# Patient Record
Sex: MALE | Race: OTHER
[De-identification: names, ages, dates, MRNs, and addresses within clinical notes are randomized per-mention and may not be internally consistent; named-entity substitution may affect disease eponyms.]

---

## 2018-04-06 ENCOUNTER — HOSPITAL ENCOUNTER (EMERGENCY)
Dept: HOSPITAL 12 - ER | Age: 78
Discharge: HOME | End: 2018-04-06
Payer: MEDICARE

## 2018-04-06 VITALS — BODY MASS INDEX: 29.55 KG/M2 | HEIGHT: 68 IN | WEIGHT: 195 LBS

## 2018-04-06 DIAGNOSIS — Z79.84: ICD-10-CM

## 2018-04-06 DIAGNOSIS — E11.9: ICD-10-CM

## 2018-04-06 DIAGNOSIS — Z79.4: ICD-10-CM

## 2018-04-06 DIAGNOSIS — Z79.899: ICD-10-CM

## 2018-04-06 DIAGNOSIS — I10: ICD-10-CM

## 2018-04-06 DIAGNOSIS — I25.10: ICD-10-CM

## 2018-04-06 DIAGNOSIS — M43.6: Primary | ICD-10-CM

## 2018-04-06 PROCEDURE — A4663 DIALYSIS BLOOD PRESSURE CUFF: HCPCS

## 2018-06-11 ENCOUNTER — HOSPITAL ENCOUNTER (INPATIENT)
Dept: HOSPITAL 12 - ER | Age: 78
LOS: 2 days | Discharge: HOME | DRG: 440 | End: 2018-06-13
Attending: INTERNAL MEDICINE | Admitting: INTERNAL MEDICINE
Payer: MEDICARE

## 2018-06-11 VITALS — HEIGHT: 68 IN | WEIGHT: 191 LBS | BODY MASS INDEX: 28.95 KG/M2

## 2018-06-11 DIAGNOSIS — K85.90: Primary | ICD-10-CM

## 2018-06-11 DIAGNOSIS — I25.10: ICD-10-CM

## 2018-06-11 DIAGNOSIS — I10: ICD-10-CM

## 2018-06-11 DIAGNOSIS — Z79.84: ICD-10-CM

## 2018-06-11 DIAGNOSIS — Z95.1: ICD-10-CM

## 2018-06-11 DIAGNOSIS — E11.65: ICD-10-CM

## 2018-06-11 DIAGNOSIS — Z85.46: ICD-10-CM

## 2018-06-11 LAB
ALP SERPL-CCNC: 53 U/L (ref 50–136)
ALT SERPL W/O P-5'-P-CCNC: 67 U/L (ref 16–63)
APPEARANCE UR: CLEAR
AST SERPL-CCNC: 42 U/L (ref 15–37)
BASOPHILS # BLD AUTO: 0.1 K/UL (ref 0–8)
BASOPHILS NFR BLD AUTO: 1.1 % (ref 0–2)
BILIRUB DIRECT SERPL-MCNC: 0.1 MG/DL (ref 0–0.2)
BILIRUB SERPL-MCNC: 0.3 MG/DL (ref 0.2–1)
BILIRUB UR QL STRIP: NEGATIVE
BUN SERPL-MCNC: 16 MG/DL (ref 7–18)
CHLORIDE SERPL-SCNC: 104 MMOL/L (ref 98–107)
CO2 SERPL-SCNC: 23 MMOL/L (ref 21–32)
COLOR UR: YELLOW
CREAT SERPL-MCNC: 1.2 MG/DL (ref 0.6–1.3)
DEPRECATED SQUAMOUS URNS QL MICRO: (no result) /HPF
EOSINOPHIL # BLD AUTO: 0.2 K/UL (ref 0–0.7)
EOSINOPHIL NFR BLD AUTO: 4.2 % (ref 0–7)
GLUCOSE SERPL-MCNC: 172 MG/DL (ref 74–106)
GLUCOSE UR STRIP-MCNC: NEGATIVE MG/DL
HCT VFR BLD AUTO: 32.2 % (ref 36.7–47.1)
HGB BLD-MCNC: 10.8 G/DL (ref 12.5–16.3)
HGB UR QL STRIP: NEGATIVE
KETONES UR STRIP-MCNC: NEGATIVE MG/DL
LEUKOCYTE ESTERASE UR QL STRIP: NEGATIVE
LIPASE SERPL-CCNC: 2126 U/L (ref 73–393)
LYMPHOCYTES # BLD AUTO: 1.8 K/UL (ref 20–40)
LYMPHOCYTES NFR BLD AUTO: 35.5 % (ref 20.5–51.5)
MCH RBC QN AUTO: 29 UUG (ref 23.8–33.4)
MCHC RBC AUTO-ENTMCNC: 34 G/DL (ref 32.5–36.3)
MCV RBC AUTO: 86.4 FL (ref 73–96.2)
MONOCYTES # BLD AUTO: 0.5 K/UL (ref 2–10)
MONOCYTES NFR BLD AUTO: 10 % (ref 0–11)
NEUTROPHILS # BLD AUTO: 2.4 K/UL (ref 1.8–8.9)
NEUTROPHILS NFR BLD AUTO: 49.2 % (ref 38.5–71.5)
NITRITE UR QL STRIP: NEGATIVE
PH UR STRIP: 6 [PH] (ref 5–8)
PLATELET # BLD AUTO: 228 K/UL (ref 152–348)
POTASSIUM SERPL-SCNC: 4.6 MMOL/L (ref 3.5–5.1)
PROT UR QL STRIP: NEGATIVE
RBC # BLD AUTO: 3.72 MIL/UL (ref 4.06–5.63)
RBC #/AREA URNS HPF: (no result) /HPF (ref 0–3)
SP GR UR STRIP: 1.01 (ref 1–1.03)
UROBILINOGEN UR STRIP-MCNC: 0.2 E.U./DL
WBC # BLD AUTO: 4.9 K/UL (ref 3.6–10.2)
WBC #/AREA URNS HPF: (no result) /HPF
WBC #/AREA URNS HPF: (no result) /HPF (ref 0–3)
WS STN SPEC: 7.5 G/DL (ref 6.4–8.2)

## 2018-06-11 PROCEDURE — A4663 DIALYSIS BLOOD PRESSURE CUFF: HCPCS

## 2018-06-11 PROCEDURE — C9113 INJ PANTOPRAZOLE SODIUM, VIA: HCPCS

## 2018-06-12 VITALS — DIASTOLIC BLOOD PRESSURE: 60 MMHG | SYSTOLIC BLOOD PRESSURE: 136 MMHG

## 2018-06-12 VITALS — SYSTOLIC BLOOD PRESSURE: 135 MMHG | DIASTOLIC BLOOD PRESSURE: 62 MMHG

## 2018-06-12 VITALS — DIASTOLIC BLOOD PRESSURE: 57 MMHG | SYSTOLIC BLOOD PRESSURE: 119 MMHG

## 2018-06-12 VITALS — SYSTOLIC BLOOD PRESSURE: 137 MMHG | DIASTOLIC BLOOD PRESSURE: 57 MMHG

## 2018-06-12 VITALS — SYSTOLIC BLOOD PRESSURE: 122 MMHG | DIASTOLIC BLOOD PRESSURE: 56 MMHG

## 2018-06-12 LAB
ALP SERPL-CCNC: 42 U/L (ref 50–136)
ALT SERPL W/O P-5'-P-CCNC: 56 U/L (ref 16–63)
AST SERPL-CCNC: 36 U/L (ref 15–37)
BASOPHILS # BLD AUTO: 0 K/UL (ref 0–8)
BASOPHILS NFR BLD AUTO: 1 % (ref 0–2)
BILIRUB SERPL-MCNC: 0.3 MG/DL (ref 0.2–1)
BUN SERPL-MCNC: 11 MG/DL (ref 7–18)
CHLORIDE SERPL-SCNC: 108 MMOL/L (ref 98–107)
CHOLEST SERPL-MCNC: 105 MG/DL (ref ?–200)
CO2 SERPL-SCNC: 25 MMOL/L (ref 21–32)
CREAT SERPL-MCNC: 1.2 MG/DL (ref 0.6–1.3)
EOSINOPHIL # BLD AUTO: 0.2 K/UL (ref 0–0.7)
EOSINOPHIL NFR BLD AUTO: 4.4 % (ref 0–7)
GLUCOSE SERPL-MCNC: 124 MG/DL (ref 74–106)
HCT VFR BLD AUTO: 30.9 % (ref 36.7–47.1)
HDLC SERPL-MCNC: 43 MG/DL (ref 40–60)
HGB BLD-MCNC: 10.5 G/DL (ref 12.5–16.3)
IRON SERPL-MCNC: 49 UG/DL (ref 50–175)
LIPASE SERPL-CCNC: 450 U/L (ref 73–393)
LYMPHOCYTES # BLD AUTO: 1.5 K/UL (ref 20–40)
LYMPHOCYTES NFR BLD AUTO: 35.8 % (ref 20.5–51.5)
MAGNESIUM SERPL-MCNC: 1.7 MG/DL (ref 1.8–2.4)
MCH RBC QN AUTO: 29.3 UUG (ref 23.8–33.4)
MCHC RBC AUTO-ENTMCNC: 34 G/DL (ref 32.5–36.3)
MCV RBC AUTO: 86.4 FL (ref 73–96.2)
MONOCYTES # BLD AUTO: 0.4 K/UL (ref 2–10)
MONOCYTES NFR BLD AUTO: 9.1 % (ref 0–11)
NEUTROPHILS # BLD AUTO: 2.1 K/UL (ref 1.8–8.9)
NEUTROPHILS NFR BLD AUTO: 49.7 % (ref 38.5–71.5)
PHOSPHATE SERPL-MCNC: 3.2 MG/DL (ref 2.5–4.9)
PLATELET # BLD AUTO: 183 K/UL (ref 152–348)
POTASSIUM SERPL-SCNC: 4.5 MMOL/L (ref 3.5–5.1)
RBC # BLD AUTO: 3.58 MIL/UL (ref 4.06–5.63)
TRIGL SERPL-MCNC: 63 MG/DL (ref 30–150)
TSH SERPL DL<=0.005 MIU/L-ACNC: 4.76 MIU/ML (ref 0.36–3.74)
WBC # BLD AUTO: 4.3 K/UL (ref 3.6–10.2)
WS STN SPEC: 6.5 G/DL (ref 6.4–8.2)

## 2018-06-12 RX ADMIN — DEXTROSE SCH MG: 50 INJECTION, SOLUTION INTRAVENOUS at 08:21

## 2018-06-12 RX ADMIN — Medication SCH EACH: at 11:56

## 2018-06-12 RX ADMIN — SODIUM CHLORIDE PRN UNIT: 9 INJECTION, SOLUTION INTRAVENOUS at 20:41

## 2018-06-12 RX ADMIN — MAGNESIUM SULFATE IN DEXTROSE SCH MLS/HR: 10 INJECTION, SOLUTION INTRAVENOUS at 14:48

## 2018-06-12 RX ADMIN — DEXTROSE AND SODIUM CHLORIDE PRN MLS/HR: 5; .45 INJECTION, SOLUTION INTRAVENOUS at 00:58

## 2018-06-12 RX ADMIN — MAGNESIUM SULFATE IN DEXTROSE SCH MLS/HR: 10 INJECTION, SOLUTION INTRAVENOUS at 15:42

## 2018-06-12 RX ADMIN — Medication SCH EACH: at 00:58

## 2018-06-12 RX ADMIN — DEXTROSE AND SODIUM CHLORIDE PRN MLS/HR: 5; .45 INJECTION, SOLUTION INTRAVENOUS at 22:29

## 2018-06-12 RX ADMIN — AMLODIPINE BESYLATE SCH MG: 5 TABLET ORAL at 16:38

## 2018-06-12 RX ADMIN — Medication SCH EACH: at 06:47

## 2018-06-12 RX ADMIN — Medication SCH EACH: at 16:45

## 2018-06-12 RX ADMIN — METFORMIN HYDROCHLORIDE SCH MG: 500 TABLET ORAL at 16:45

## 2018-06-12 RX ADMIN — Medication SCH EACH: at 20:34

## 2018-06-12 RX ADMIN — LOSARTAN POTASSIUM SCH MG: 50 TABLET, FILM COATED ORAL at 16:39

## 2018-06-12 RX ADMIN — DEXTROSE AND SODIUM CHLORIDE PRN MLS/HR: 5; .45 INJECTION, SOLUTION INTRAVENOUS at 10:41

## 2018-06-12 NOTE — NUR
ENC TO OOB UP AMBULATE IN THE [address] AND UP IN CHAIR DOING WELL  FAMILY DAUGHTER AT 
BEDSIDE NO N/V OR PAIN

## 2018-06-12 NOTE — NUR
STABLE HEMODYNAMIC STATUS PAIN UNDER CONTROL CONTINUE IVF  SAFETY MEASURE PROVIDED AND CALL 
LIGHT IN REACH

## 2018-06-12 NOTE — NUR
Received patient from ER in no acute distress. Patient A/Ox4, [place] citizen-speaking. Grandson at 
bedside. VSS. Sinus bradycardia on monitor. Room air.

No complaints of pain or nausea at this time. Will continue plan of care.

## 2018-06-12 NOTE — NUR
Teaching provided regarding patient's diagnosis to both patient and grandson. Nepalese 
literature education and English given. Both verbalize understanding

## 2018-06-12 NOTE — NUR
Received patient awake in bed, alert and oriented. Patient is on Sinus bradycardia with HR 
of 50, no SOB, no chest pain noted. Kept clean and dry. Placed call light within reach and 
reminded to call when needed.

## 2018-06-12 NOTE — NUR
Grandson at bedside, states that patient "might have an Advance Directive" and that 
patient's daughter Liset Liu, [phone], may be able to provide in AM. Will follow up.

## 2018-06-12 NOTE — NUR
NICKY ENGLANDS SEEN PATIENT AND PT C/O OF HUNGRY AND H/A NEW ORDER IN CHART START CLEAR LIQ 
DIET  TODAY  SHANNAN MOD AMT NO N/V AND TYLENOL PO GIVEN

## 2018-06-12 NOTE — NUR
awake alert cooperate well no pain or n/v  keep npo continue ivf  ,resting well with call 
bell in reach and remiond to call  WHEN  NEEDED

## 2018-06-13 VITALS — SYSTOLIC BLOOD PRESSURE: 107 MMHG | DIASTOLIC BLOOD PRESSURE: 59 MMHG

## 2018-06-13 VITALS — SYSTOLIC BLOOD PRESSURE: 125 MMHG | DIASTOLIC BLOOD PRESSURE: 55 MMHG

## 2018-06-13 VITALS — DIASTOLIC BLOOD PRESSURE: 58 MMHG | SYSTOLIC BLOOD PRESSURE: 138 MMHG

## 2018-06-13 LAB
BASOPHILS # BLD AUTO: 0.1 K/UL (ref 0–8)
BASOPHILS NFR BLD AUTO: 1.3 % (ref 0–2)
BUN SERPL-MCNC: 8 MG/DL (ref 7–18)
CHLORIDE SERPL-SCNC: 107 MMOL/L (ref 98–107)
CO2 SERPL-SCNC: 26 MMOL/L (ref 21–32)
CREAT SERPL-MCNC: 1.1 MG/DL (ref 0.6–1.3)
EOSINOPHIL # BLD AUTO: 0.3 K/UL (ref 0–0.7)
EOSINOPHIL NFR BLD AUTO: 6.3 % (ref 0–7)
GLUCOSE SERPL-MCNC: 127 MG/DL (ref 74–106)
HCT VFR BLD AUTO: 32.1 % (ref 36.7–47.1)
HGB BLD-MCNC: 10.8 G/DL (ref 12.5–16.3)
LIPASE SERPL-CCNC: 366 U/L (ref 73–393)
LYMPHOCYTES # BLD AUTO: 1.5 K/UL (ref 20–40)
LYMPHOCYTES NFR BLD AUTO: 29.1 % (ref 20.5–51.5)
MAGNESIUM SERPL-MCNC: 2 MG/DL (ref 1.8–2.4)
MCH RBC QN AUTO: 29.1 UUG (ref 23.8–33.4)
MCHC RBC AUTO-ENTMCNC: 34 G/DL (ref 32.5–36.3)
MCV RBC AUTO: 86.5 FL (ref 73–96.2)
MONOCYTES # BLD AUTO: 0.4 K/UL (ref 2–10)
MONOCYTES NFR BLD AUTO: 7.8 % (ref 0–11)
NEUTROPHILS # BLD AUTO: 2.8 K/UL (ref 1.8–8.9)
NEUTROPHILS NFR BLD AUTO: 55.5 % (ref 38.5–71.5)
PHOSPHATE SERPL-MCNC: 3.1 MG/DL (ref 2.5–4.9)
PLATELET # BLD AUTO: 211 K/UL (ref 152–348)
POTASSIUM SERPL-SCNC: 4.5 MMOL/L (ref 3.5–5.1)
RBC # BLD AUTO: 3.71 MIL/UL (ref 4.06–5.63)
WBC # BLD AUTO: 5.1 K/UL (ref 3.6–10.2)

## 2018-06-13 RX ADMIN — METFORMIN HYDROCHLORIDE SCH MG: 500 TABLET ORAL at 08:51

## 2018-06-13 RX ADMIN — LOSARTAN POTASSIUM SCH MG: 50 TABLET, FILM COATED ORAL at 08:51

## 2018-06-13 RX ADMIN — Medication SCH EACH: at 06:01

## 2018-06-13 RX ADMIN — Medication SCH EACH: at 11:33

## 2018-06-13 RX ADMIN — AMLODIPINE BESYLATE SCH MG: 5 TABLET ORAL at 08:50

## 2018-06-13 RX ADMIN — SODIUM CHLORIDE PRN UNIT: 9 INJECTION, SOLUTION INTRAVENOUS at 11:35

## 2018-06-13 RX ADMIN — DEXTROSE SCH MG: 50 INJECTION, SOLUTION INTRAVENOUS at 08:51

## 2018-06-13 NOTE — NUR
Pt stable, pleasant, and cooperative. Pt tolerated breakfast mechanical soft diet. 
Self-ambulated down the bui, no pain, no SOB.

## 2018-06-13 NOTE — NUR
PT D/C to home under the care of daughter. No pain, no SOB, no N/V. Pt will follow up with 
PCP. Self-ambulated out of the unit.

## 2018-06-13 NOTE — NUR
Patient remained fully rested. Patient is on Sinus Bradycardia with HR of 44. No complaint 
of SOB, no chest pain noted.

## 2018-09-04 ENCOUNTER — HOSPITAL ENCOUNTER (EMERGENCY)
Dept: HOSPITAL 12 - ER | Age: 78
Discharge: HOME | End: 2018-09-04
Payer: MEDICARE

## 2018-09-04 VITALS — WEIGHT: 180 LBS | HEIGHT: 68 IN | BODY MASS INDEX: 27.28 KG/M2

## 2018-09-04 VITALS — SYSTOLIC BLOOD PRESSURE: 131 MMHG | DIASTOLIC BLOOD PRESSURE: 69 MMHG

## 2018-09-04 DIAGNOSIS — E11.9: ICD-10-CM

## 2018-09-04 DIAGNOSIS — E78.5: ICD-10-CM

## 2018-09-04 DIAGNOSIS — I10: ICD-10-CM

## 2018-09-04 DIAGNOSIS — Z95.1: ICD-10-CM

## 2018-09-04 DIAGNOSIS — I25.10: ICD-10-CM

## 2018-09-04 DIAGNOSIS — K85.90: Primary | ICD-10-CM

## 2018-09-04 LAB
ALP SERPL-CCNC: 47 U/L (ref 50–136)
ALT SERPL W/O P-5'-P-CCNC: 62 U/L (ref 16–63)
APPEARANCE UR: CLEAR
AST SERPL-CCNC: 56 U/L (ref 15–37)
BASOPHILS # BLD AUTO: 0 K/UL (ref 0–8)
BASOPHILS NFR BLD AUTO: 0.7 % (ref 0–2)
BILIRUB DIRECT SERPL-MCNC: 0.1 MG/DL (ref 0–0.2)
BILIRUB SERPL-MCNC: 0.3 MG/DL (ref 0.2–1)
BILIRUB UR QL STRIP: NEGATIVE
BUN SERPL-MCNC: 18 MG/DL (ref 7–18)
CHLORIDE SERPL-SCNC: 109 MMOL/L (ref 98–107)
CO2 SERPL-SCNC: 23 MMOL/L (ref 21–32)
COLOR UR: YELLOW
CREAT SERPL-MCNC: 1.3 MG/DL (ref 0.6–1.3)
DEPRECATED SQUAMOUS URNS QL MICRO: (no result) /HPF
EOSINOPHIL # BLD AUTO: 0.2 K/UL (ref 0–0.7)
EOSINOPHIL NFR BLD AUTO: 3.4 % (ref 0–7)
GLUCOSE SERPL-MCNC: 81 MG/DL (ref 74–106)
GLUCOSE UR STRIP-MCNC: NEGATIVE MG/DL
HCT VFR BLD AUTO: 32.1 % (ref 36.7–47.1)
HGB BLD-MCNC: 10.7 G/DL (ref 12.5–16.3)
HGB UR QL STRIP: NEGATIVE
KETONES UR STRIP-MCNC: NEGATIVE MG/DL
LEUKOCYTE ESTERASE UR QL STRIP: NEGATIVE
LIPASE SERPL-CCNC: 1344 U/L (ref 73–393)
LYMPHOCYTES # BLD AUTO: 1.6 K/UL (ref 20–40)
LYMPHOCYTES NFR BLD AUTO: 32.2 % (ref 20.5–51.5)
MCH RBC QN AUTO: 29.1 UUG (ref 23.8–33.4)
MCHC RBC AUTO-ENTMCNC: 33 G/DL (ref 32.5–36.3)
MCV RBC AUTO: 87.2 FL (ref 73–96.2)
MONOCYTES # BLD AUTO: 0.5 K/UL (ref 2–10)
MONOCYTES NFR BLD AUTO: 9.4 % (ref 0–11)
NEUTROPHILS # BLD AUTO: 2.7 K/UL (ref 1.8–8.9)
NEUTROPHILS NFR BLD AUTO: 54.3 % (ref 38.5–71.5)
NITRITE UR QL STRIP: NEGATIVE
PH UR STRIP: 5.5 [PH] (ref 5–8)
PLATELET # BLD AUTO: 206 K/UL (ref 152–348)
POTASSIUM SERPL-SCNC: 4.8 MMOL/L (ref 3.5–5.1)
PROT UR QL STRIP: NEGATIVE
RBC # BLD AUTO: 3.68 MIL/UL (ref 4.06–5.63)
SP GR UR STRIP: <=1.005 (ref 1–1.03)
UROBILINOGEN UR STRIP-MCNC: 0.2 E.U./DL
WBC # BLD AUTO: 5.1 K/UL (ref 3.6–10.2)
WBC #/AREA URNS HPF: (no result) /HPF (ref 0–3)
WS STN SPEC: 7.5 G/DL (ref 6.4–8.2)

## 2018-09-04 PROCEDURE — A4663 DIALYSIS BLOOD PRESSURE CUFF: HCPCS

## 2018-09-04 NOTE — NUR
Patient discharged to home in stable conditon.  Written and verbal after care 
instructions given. 

Patient verbalizes understanding of instructions.pt walks in steady gait. pt 
with daughter who provided translation from Greenlandic

## 2019-03-13 ENCOUNTER — HOSPITAL ENCOUNTER (EMERGENCY)
Dept: HOSPITAL 12 - ER | Age: 79
Discharge: HOME | End: 2019-03-13
Payer: MEDICARE

## 2019-03-13 VITALS — HEIGHT: 69 IN | WEIGHT: 180 LBS | BODY MASS INDEX: 26.66 KG/M2

## 2019-03-13 VITALS — SYSTOLIC BLOOD PRESSURE: 143 MMHG | DIASTOLIC BLOOD PRESSURE: 73 MMHG

## 2019-03-13 DIAGNOSIS — E78.5: ICD-10-CM

## 2019-03-13 DIAGNOSIS — E11.9: ICD-10-CM
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
## 2019-03-13 DIAGNOSIS — Z79.899: ICD-10-CM

## 2019-03-13 DIAGNOSIS — Z95.1: ICD-10-CM

## 2019-03-13 DIAGNOSIS — Z79.4: ICD-10-CM
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
## 2019-03-13 DIAGNOSIS — I25.10: ICD-10-CM

## 2019-03-13 DIAGNOSIS — M54.6: ICD-10-CM

## 2019-03-13 DIAGNOSIS — M54.5: Primary | ICD-10-CM
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
## 2019-03-13 DIAGNOSIS — I10: ICD-10-CM

## 2019-03-13 PROCEDURE — A4663 DIALYSIS BLOOD PRESSURE CUFF: HCPCS

## 2019-12-02 ENCOUNTER — HOSPITAL ENCOUNTER (EMERGENCY)
Dept: HOSPITAL 12 - ER | Age: 79
Discharge: HOME | End: 2019-12-02
Payer: MEDICARE

## 2019-12-02 VITALS — DIASTOLIC BLOOD PRESSURE: 78 MMHG | SYSTOLIC BLOOD PRESSURE: 125 MMHG

## 2019-12-02 VITALS — HEIGHT: 65 IN | WEIGHT: 185 LBS | BODY MASS INDEX: 30.82 KG/M2

## 2019-12-02 DIAGNOSIS — Z79.84: ICD-10-CM

## 2019-12-02 DIAGNOSIS — M54.2: ICD-10-CM

## 2019-12-02 DIAGNOSIS — E11.9: ICD-10-CM

## 2019-12-02 DIAGNOSIS — I25.10: ICD-10-CM

## 2019-12-02 DIAGNOSIS — Z95.1: ICD-10-CM

## 2019-12-02 DIAGNOSIS — R51: Primary | ICD-10-CM

## 2019-12-02 DIAGNOSIS — E78.5: ICD-10-CM

## 2019-12-02 DIAGNOSIS — Z79.4: ICD-10-CM

## 2019-12-02 DIAGNOSIS — Z79.899: ICD-10-CM

## 2019-12-02 DIAGNOSIS — I10: ICD-10-CM

## 2019-12-02 LAB
ALP SERPL-CCNC: 50 U/L (ref 50–136)
ALT SERPL W/O P-5'-P-CCNC: 28 U/L (ref 16–63)
AST SERPL-CCNC: 24 U/L (ref 15–37)
BASOPHILS # BLD AUTO: 0.1 K/UL (ref 0–8)
BASOPHILS NFR BLD AUTO: 1.1 % (ref 0–2)
BILIRUB DIRECT SERPL-MCNC: 0.1 MG/DL (ref 0–0.2)
BILIRUB SERPL-MCNC: 0.4 MG/DL (ref 0.2–1)
BUN SERPL-MCNC: 18 MG/DL (ref 7–18)
CHLORIDE SERPL-SCNC: 106 MMOL/L (ref 98–107)
CO2 SERPL-SCNC: 23 MMOL/L (ref 21–32)
CREAT SERPL-MCNC: 1.3 MG/DL (ref 0.6–1.3)
EOSINOPHIL # BLD AUTO: 0.1 K/UL (ref 0–0.7)
EOSINOPHIL NFR BLD AUTO: 1.1 % (ref 0–7)
GLUCOSE SERPL-MCNC: 178 MG/DL (ref 74–106)
HCT VFR BLD AUTO: 33.9 % (ref 36.7–47.1)
HGB BLD-MCNC: 10.9 G/DL (ref 12.5–16.3)
LYMPHOCYTES # BLD AUTO: 1.4 K/UL (ref 20–40)
LYMPHOCYTES NFR BLD AUTO: 26.4 % (ref 20.5–51.5)
MCH RBC QN AUTO: 26.6 UUG (ref 23.8–33.4)
MCHC RBC AUTO-ENTMCNC: 32 G/DL (ref 32.5–36.3)
MCV RBC AUTO: 82.6 FL (ref 73–96.2)
MONOCYTES # BLD AUTO: 0.4 K/UL (ref 2–10)
MONOCYTES NFR BLD AUTO: 8.2 % (ref 0–11)
NEUTROPHILS # BLD AUTO: 3.4 K/UL (ref 1.8–8.9)
NEUTROPHILS NFR BLD AUTO: 63.2 % (ref 38.5–71.5)
PLATELET # BLD AUTO: 227 K/UL (ref 152–348)
POTASSIUM SERPL-SCNC: 4.7 MMOL/L (ref 3.5–5.1)
RBC # BLD AUTO: 4.1 MIL/UL (ref 4.06–5.63)
WBC # BLD AUTO: 5.4 K/UL (ref 3.6–10.2)
WS STN SPEC: 7.7 G/DL (ref 6.4–8.2)

## 2019-12-02 PROCEDURE — 96374 THER/PROPH/DIAG INJ IV PUSH: CPT

## 2019-12-02 PROCEDURE — 85025 COMPLETE CBC W/AUTO DIFF WBC: CPT

## 2019-12-02 PROCEDURE — 80048 BASIC METABOLIC PNL TOTAL CA: CPT

## 2019-12-02 PROCEDURE — 99284 EMERGENCY DEPT VISIT MOD MDM: CPT

## 2019-12-02 PROCEDURE — 85730 THROMBOPLASTIN TIME PARTIAL: CPT

## 2019-12-02 PROCEDURE — 70450 CT HEAD/BRAIN W/O DYE: CPT

## 2019-12-02 PROCEDURE — A4663 DIALYSIS BLOOD PRESSURE CUFF: HCPCS

## 2019-12-02 PROCEDURE — 80076 HEPATIC FUNCTION PANEL: CPT

## 2019-12-02 PROCEDURE — 36415 COLL VENOUS BLD VENIPUNCTURE: CPT

## 2019-12-02 PROCEDURE — 93005 ELECTROCARDIOGRAM TRACING: CPT

## 2019-12-02 PROCEDURE — 84484 ASSAY OF TROPONIN QUANT: CPT

## 2019-12-02 PROCEDURE — 96375 TX/PRO/DX INJ NEW DRUG ADDON: CPT

## 2019-12-02 NOTE — NUR
PATIENT STATES HE FELL LAST WEDNESDAY AND HIS HEAD HURTS EVER SINCE. HE IS 
A/A/OX3 IN NO DISTRESS. HE IS AMBULATORY WITH NO NEURO DEFICITS REPORTED..  SON 
AT BEDSIDE

## 2019-12-04 ENCOUNTER — HOSPITAL ENCOUNTER (INPATIENT)
Dept: HOSPITAL 12 - ER | Age: 79
LOS: 1 days | Discharge: HOME | DRG: 303 | End: 2019-12-05
Payer: MEDICARE

## 2019-12-04 VITALS — HEIGHT: 68 IN | WEIGHT: 190 LBS | BODY MASS INDEX: 28.79 KG/M2

## 2019-12-04 VITALS — DIASTOLIC BLOOD PRESSURE: 64 MMHG | SYSTOLIC BLOOD PRESSURE: 172 MMHG

## 2019-12-04 VITALS — SYSTOLIC BLOOD PRESSURE: 161 MMHG | DIASTOLIC BLOOD PRESSURE: 60 MMHG

## 2019-12-04 VITALS — SYSTOLIC BLOOD PRESSURE: 155 MMHG | DIASTOLIC BLOOD PRESSURE: 69 MMHG

## 2019-12-04 DIAGNOSIS — E11.9: ICD-10-CM

## 2019-12-04 DIAGNOSIS — Z95.1: ICD-10-CM

## 2019-12-04 DIAGNOSIS — Z79.4: ICD-10-CM

## 2019-12-04 DIAGNOSIS — E78.5: ICD-10-CM

## 2019-12-04 DIAGNOSIS — D63.8: ICD-10-CM

## 2019-12-04 DIAGNOSIS — J98.11: ICD-10-CM

## 2019-12-04 DIAGNOSIS — I70.0: ICD-10-CM

## 2019-12-04 DIAGNOSIS — I25.119: Primary | ICD-10-CM

## 2019-12-04 DIAGNOSIS — I11.9: ICD-10-CM

## 2019-12-04 LAB
ALP SERPL-CCNC: 51 U/L (ref 50–136)
ALT SERPL W/O P-5'-P-CCNC: 27 U/L (ref 16–63)
APPEARANCE UR: CLEAR
AST SERPL-CCNC: 28 U/L (ref 15–37)
BASOPHILS # BLD AUTO: 0.1 K/UL (ref 0–8)
BASOPHILS NFR BLD AUTO: 1.1 % (ref 0–2)
BILIRUB DIRECT SERPL-MCNC: 0.1 MG/DL (ref 0–0.2)
BILIRUB SERPL-MCNC: 0.3 MG/DL (ref 0.2–1)
BILIRUB UR QL STRIP: NEGATIVE
BUN SERPL-MCNC: 16 MG/DL (ref 7–18)
CHLORIDE SERPL-SCNC: 106 MMOL/L (ref 98–107)
CO2 SERPL-SCNC: 24 MMOL/L (ref 21–32)
COLOR UR: YELLOW
CREAT SERPL-MCNC: 1.2 MG/DL (ref 0.6–1.3)
EOSINOPHIL # BLD AUTO: 0 K/UL (ref 0–0.7)
EOSINOPHIL NFR BLD AUTO: 0.8 % (ref 0–7)
GLUCOSE SERPL-MCNC: 142 MG/DL (ref 74–106)
GLUCOSE UR STRIP-MCNC: NEGATIVE MG/DL
HCT VFR BLD AUTO: 32.5 % (ref 36.7–47.1)
HGB BLD-MCNC: 11.2 G/DL (ref 12.5–16.3)
HGB UR QL STRIP: NEGATIVE
KETONES UR STRIP-MCNC: NEGATIVE MG/DL
LEUKOCYTE ESTERASE UR QL STRIP: NEGATIVE
LYMPHOCYTES # BLD AUTO: 1.2 K/UL (ref 20–40)
LYMPHOCYTES NFR BLD AUTO: 22.7 % (ref 20.5–51.5)
MCH RBC QN AUTO: 28.5 UUG (ref 23.8–33.4)
MCHC RBC AUTO-ENTMCNC: 35 G/DL (ref 32.5–36.3)
MCV RBC AUTO: 82.8 FL (ref 73–96.2)
MONOCYTES # BLD AUTO: 0.4 K/UL (ref 2–10)
MONOCYTES NFR BLD AUTO: 7.1 % (ref 0–11)
NEUTROPHILS # BLD AUTO: 3.5 K/UL (ref 1.8–8.9)
NEUTROPHILS NFR BLD AUTO: 68.3 % (ref 38.5–71.5)
NITRITE UR QL STRIP: NEGATIVE
PH UR STRIP: 7 [PH] (ref 5–8)
PLATELET # BLD AUTO: 219 K/UL (ref 152–348)
POTASSIUM SERPL-SCNC: 4.6 MMOL/L (ref 3.5–5.1)
RBC # BLD AUTO: 3.92 MIL/UL (ref 4.06–5.63)
SP GR UR STRIP: 1.01 (ref 1–1.03)
UROBILINOGEN UR STRIP-MCNC: 0.2 E.U./DL
WBC # BLD AUTO: 5.2 K/UL (ref 3.6–10.2)
WS STN SPEC: 7.4 G/DL (ref 6.4–8.2)

## 2019-12-04 PROCEDURE — A4663 DIALYSIS BLOOD PRESSURE CUFF: HCPCS

## 2019-12-04 PROCEDURE — G0378 HOSPITAL OBSERVATION PER HR: HCPCS

## 2019-12-04 RX ADMIN — Medication SCH MG: at 12:07

## 2019-12-04 RX ADMIN — SODIUM CHLORIDE PRN UNIT: 9 INJECTION, SOLUTION INTRAVENOUS at 17:26

## 2019-12-04 RX ADMIN — METFORMIN HYDROCHLORIDE SCH MG: 500 TABLET ORAL at 17:25

## 2019-12-04 RX ADMIN — Medication SCH EACH: at 16:47

## 2019-12-04 RX ADMIN — LOSARTAN POTASSIUM SCH MG: 25 TABLET, FILM COATED ORAL at 17:25

## 2019-12-04 RX ADMIN — Medication SCH MG: at 20:37

## 2019-12-04 RX ADMIN — Medication SCH EACH: at 20:42

## 2019-12-04 RX ADMIN — AMLODIPINE BESYLATE SCH MG: 5 TABLET ORAL at 17:25

## 2019-12-04 NOTE — NUR
Received patient from ER. Patient alert oriented x4. IV site at right AC 18 gauge. Patient 
ambulatory with steady gait. Patient denies pain and discomfort. Patient on 2L/min of oxygen 
via NC. No acute distress noted. Bed at lowest position. Call light within reach. Side rails 
up x 2. Will continue to monitor.

## 2019-12-04 NOTE — NUR
Patient rested throughout shift. Patient denies pain and discomfort. Patients BP elevated, 
NP Jessika notified BP medications administered. Troponin levels within normal limits. Urine 
collected and sent to the lab. Safety measures provided. Will endorse to oncoming nurse.

## 2019-12-05 ENCOUNTER — HOSPITAL ENCOUNTER (OUTPATIENT)
Dept: HOSPITAL 54 - CT | Age: 79
Discharge: HOME | End: 2019-12-05
Attending: INTERNAL MEDICINE
Payer: MEDICARE

## 2019-12-05 VITALS — HEIGHT: 68 IN | WEIGHT: 190 LBS | BODY MASS INDEX: 28.79 KG/M2

## 2019-12-05 VITALS — SYSTOLIC BLOOD PRESSURE: 121 MMHG | DIASTOLIC BLOOD PRESSURE: 94 MMHG

## 2019-12-05 VITALS — DIASTOLIC BLOOD PRESSURE: 62 MMHG | SYSTOLIC BLOOD PRESSURE: 145 MMHG

## 2019-12-05 VITALS — SYSTOLIC BLOOD PRESSURE: 130 MMHG | DIASTOLIC BLOOD PRESSURE: 71 MMHG

## 2019-12-05 VITALS — DIASTOLIC BLOOD PRESSURE: 57 MMHG | SYSTOLIC BLOOD PRESSURE: 121 MMHG

## 2019-12-05 DIAGNOSIS — M47.814: ICD-10-CM

## 2019-12-05 DIAGNOSIS — I25.10: Primary | ICD-10-CM

## 2019-12-05 LAB
BASOPHILS # BLD AUTO: 0 K/UL (ref 0–8)
BASOPHILS NFR BLD AUTO: 0.8 % (ref 0–2)
BUN SERPL-MCNC: 12 MG/DL (ref 7–18)
CHLORIDE SERPL-SCNC: 109 MMOL/L (ref 98–107)
CHOLEST SERPL-MCNC: 55 MG/DL (ref ?–200)
CO2 SERPL-SCNC: 26 MMOL/L (ref 21–32)
CREAT SERPL-MCNC: 1.1 MG/DL (ref 0.6–1.3)
EOSINOPHIL # BLD AUTO: 0.2 K/UL (ref 0–0.7)
EOSINOPHIL NFR BLD AUTO: 3.9 % (ref 0–7)
GLUCOSE SERPL-MCNC: 83 MG/DL (ref 74–106)
HCT VFR BLD AUTO: 33.4 % (ref 36.7–47.1)
HDLC SERPL-MCNC: 25 MG/DL (ref 40–60)
HGB BLD-MCNC: 10.6 G/DL (ref 12.5–16.3)
LYMPHOCYTES # BLD AUTO: 1.7 K/UL (ref 20–40)
LYMPHOCYTES NFR BLD AUTO: 32.9 % (ref 20.5–51.5)
MAGNESIUM SERPL-MCNC: 1.8 MG/DL (ref 1.8–2.4)
MCH RBC QN AUTO: 25.8 UUG (ref 23.8–33.4)
MCHC RBC AUTO-ENTMCNC: 32 G/DL (ref 32.5–36.3)
MCV RBC AUTO: 81.3 FL (ref 73–96.2)
MONOCYTES # BLD AUTO: 0.4 K/UL (ref 2–10)
MONOCYTES NFR BLD AUTO: 8.6 % (ref 0–11)
NEUTROPHILS # BLD AUTO: 2.8 K/UL (ref 1.8–8.9)
NEUTROPHILS NFR BLD AUTO: 53.8 % (ref 38.5–71.5)
PHOSPHATE SERPL-MCNC: 3.3 MG/DL (ref 2.5–4.9)
PLATELET # BLD AUTO: 209 K/UL (ref 152–348)
POTASSIUM SERPL-SCNC: 4.7 MMOL/L (ref 3.5–5.1)
RBC # BLD AUTO: 4.11 MIL/UL (ref 4.06–5.63)
TRIGL SERPL-MCNC: 64 MG/DL (ref 30–150)
WBC # BLD AUTO: 5.2 K/UL (ref 3.6–10.2)

## 2019-12-05 PROCEDURE — B2231ZZ COMPUTERIZED TOMOGRAPHY (CT SCAN) OF MULTIPLE CORONARY ARTERY BYPASS GRAFTS USING LOW OSMOLAR CONTRAST: ICD-10-PCS

## 2019-12-05 PROCEDURE — 75574 CT ANGIO HRT W/3D IMAGE: CPT

## 2019-12-05 RX ADMIN — LOSARTAN POTASSIUM SCH MG: 25 TABLET, FILM COATED ORAL at 09:00

## 2019-12-05 RX ADMIN — AMLODIPINE BESYLATE SCH MG: 5 TABLET ORAL at 08:48

## 2019-12-05 RX ADMIN — SODIUM CHLORIDE PRN UNIT: 9 INJECTION, SOLUTION INTRAVENOUS at 16:58

## 2019-12-05 RX ADMIN — METFORMIN HYDROCHLORIDE SCH MG: 500 TABLET ORAL at 08:44

## 2019-12-05 RX ADMIN — Medication SCH MG: at 09:00

## 2019-12-05 RX ADMIN — Medication SCH EACH: at 12:37

## 2019-12-05 RX ADMIN — Medication SCH EACH: at 06:35

## 2019-12-05 RX ADMIN — Medication SCH EACH: at 16:47

## 2019-12-05 NOTE — NUR
Discharge paperwork reviewed with Pt and daughter at bedside. Documents signed, copies 
placed in chart, and originals provided to Pt. All discahrge concerns addressed. No skin 
integrity photos to be taken. No home medications to be returned. Belongings accounted for, 
IV removed intact, and tele monitor removed. VS stable. Pt safely transferred to wheelchair 
and escorted down to private car with daughter driving. ID band removed. Will remove Pt from 
system shortly.

## 2019-12-05 NOTE — NUR
Pt received this morning. Pt assessed, denies pain. Primarily [place]ralt[place] speaking, able to make 
needs known. AAOx4. Daughter visiting at bedside. Plan for today including transfer via 
ambulance for CTA discussed. Morning medications tolerated, Pt refused 2/3 BP meds claiming 
he only takes 1 at home for /55, HR 49. Pt teaching provided. All comfort and safety 
needs attended to at this time. Right 18 AC hep lock intact. Call light within reach. Will 
continue to monitor.

## 2019-12-05 NOTE — NUR
Pt and daughter at bedside spoke with Dr. Mtz over the phone discussing the results of 
the CTA procedure from this morning. All concerns addressed. Order for discharge received. 
Plan for discharge by private car with daughter arranged for after dinner. Belongings 
accounted for and list signed. Call light placed within reach. Will continue to monitor and 
follow up.

## 2019-12-05 NOTE — NUR
NOTIFY DR. AGUDELO REGARDING PT TROPONIN RESULT OF <0.017.  DR WILEY JUST ORDERED 
NPO AND PT WILL HAVE CTA IN AM. PT IN NO ACUTE DISTRESS. SAFETY AND COMFORT PROVIDED. WILL 
CONTINUE TO MONITOR.

## 2019-12-05 NOTE — NUR
VS stable. Report given to EMTs. Pt picked up for transport by ambulance to [facility] for CTA. All 
safety needs addressed.

## 2019-12-05 NOTE — NUR
PT SLEPT INTERMITTENTLY. PT SHOWS NO SIGNS OF ACUTE DISTRESS.IV INTACT. PRESCRIBED 
MEDICATION GIVEN AND PT TOLERATED IT WELL. PT COOPERATIVE WITH CARE. SAFETY AND COMFORT 
PROVIDED. ALL NEEDS ARE MET. WILL ENDORSE TO INCOMING NURSE FOR CONTINUITY OF CARE.

## 2019-12-17 ENCOUNTER — HOSPITAL ENCOUNTER (INPATIENT)
Dept: HOSPITAL 12 - ER | Age: 79
LOS: 2 days | Discharge: HOME | DRG: 880 | End: 2019-12-19
Payer: MEDICARE

## 2019-12-17 VITALS — HEIGHT: 68 IN | BODY MASS INDEX: 29.31 KG/M2 | WEIGHT: 193.38 LBS

## 2019-12-17 DIAGNOSIS — I25.10: ICD-10-CM

## 2019-12-17 DIAGNOSIS — I70.0: ICD-10-CM

## 2019-12-17 DIAGNOSIS — Z95.1: ICD-10-CM

## 2019-12-17 DIAGNOSIS — Z95.810: ICD-10-CM

## 2019-12-17 DIAGNOSIS — Z79.82: ICD-10-CM

## 2019-12-17 DIAGNOSIS — C61: ICD-10-CM

## 2019-12-17 DIAGNOSIS — I11.9: ICD-10-CM

## 2019-12-17 DIAGNOSIS — E11.9: ICD-10-CM

## 2019-12-17 DIAGNOSIS — D63.8: ICD-10-CM

## 2019-12-17 DIAGNOSIS — E78.5: ICD-10-CM

## 2019-12-17 DIAGNOSIS — F41.9: Primary | ICD-10-CM

## 2019-12-17 DIAGNOSIS — Z79.899: ICD-10-CM

## 2019-12-17 DIAGNOSIS — Z91.14: ICD-10-CM

## 2019-12-17 DIAGNOSIS — E78.00: ICD-10-CM

## 2019-12-17 DIAGNOSIS — R00.1: ICD-10-CM

## 2019-12-17 LAB
ALP SERPL-CCNC: 54 U/L (ref 50–136)
ALT SERPL W/O P-5'-P-CCNC: 45 U/L (ref 16–63)
APPEARANCE UR: CLEAR
AST SERPL-CCNC: 37 U/L (ref 15–37)
BASOPHILS # BLD AUTO: 0.1 K/UL (ref 0–8)
BASOPHILS NFR BLD AUTO: 1.3 % (ref 0–2)
BILIRUB DIRECT SERPL-MCNC: 0.1 MG/DL (ref 0–0.2)
BILIRUB SERPL-MCNC: 0.3 MG/DL (ref 0.2–1)
BILIRUB UR QL STRIP: NEGATIVE
BUN SERPL-MCNC: 17 MG/DL (ref 7–18)
CHLORIDE SERPL-SCNC: 104 MMOL/L (ref 98–107)
CO2 SERPL-SCNC: 26 MMOL/L (ref 21–32)
COLOR UR: YELLOW
CREAT SERPL-MCNC: 1.3 MG/DL (ref 0.6–1.3)
EOSINOPHIL # BLD AUTO: 0.1 K/UL (ref 0–0.7)
EOSINOPHIL NFR BLD AUTO: 2.4 % (ref 0–7)
GLUCOSE SERPL-MCNC: 202 MG/DL (ref 74–106)
GLUCOSE UR STRIP-MCNC: NEGATIVE MG/DL
HCT VFR BLD AUTO: 33.2 % (ref 36.7–47.1)
HGB BLD-MCNC: 10.6 G/DL (ref 12.5–16.3)
HGB UR QL STRIP: NEGATIVE
KETONES UR STRIP-MCNC: NEGATIVE MG/DL
LEUKOCYTE ESTERASE UR QL STRIP: NEGATIVE
LYMPHOCYTES # BLD AUTO: 1.4 K/UL (ref 20–40)
LYMPHOCYTES NFR BLD AUTO: 27.7 % (ref 20.5–51.5)
MCH RBC QN AUTO: 26 UUG (ref 23.8–33.4)
MCHC RBC AUTO-ENTMCNC: 32 G/DL (ref 32.5–36.3)
MCV RBC AUTO: 81.2 FL (ref 73–96.2)
MONOCYTES # BLD AUTO: 0.5 K/UL (ref 2–10)
MONOCYTES NFR BLD AUTO: 8.9 % (ref 0–11)
NEUTROPHILS # BLD AUTO: 3.1 K/UL (ref 1.8–8.9)
NEUTROPHILS NFR BLD AUTO: 59.7 % (ref 38.5–71.5)
NITRITE UR QL STRIP: NEGATIVE
PH UR STRIP: 7 [PH] (ref 5–8)
PLATELET # BLD AUTO: 222 K/UL (ref 152–348)
POTASSIUM SERPL-SCNC: 4.7 MMOL/L (ref 3.5–5.1)
RBC # BLD AUTO: 4.09 MIL/UL (ref 4.06–5.63)
SP GR UR STRIP: 1.01 (ref 1–1.03)
UROBILINOGEN UR STRIP-MCNC: 0.2 E.U./DL
WBC # BLD AUTO: 5.1 K/UL (ref 3.6–10.2)
WS STN SPEC: 7.1 G/DL (ref 6.4–8.2)

## 2019-12-17 PROCEDURE — G0378 HOSPITAL OBSERVATION PER HR: HCPCS

## 2019-12-17 PROCEDURE — A4663 DIALYSIS BLOOD PRESSURE CUFF: HCPCS

## 2019-12-18 VITALS — DIASTOLIC BLOOD PRESSURE: 68 MMHG | SYSTOLIC BLOOD PRESSURE: 103 MMHG

## 2019-12-18 VITALS — SYSTOLIC BLOOD PRESSURE: 110 MMHG | DIASTOLIC BLOOD PRESSURE: 51 MMHG

## 2019-12-18 VITALS — SYSTOLIC BLOOD PRESSURE: 153 MMHG | DIASTOLIC BLOOD PRESSURE: 68 MMHG

## 2019-12-18 VITALS — SYSTOLIC BLOOD PRESSURE: 152 MMHG | DIASTOLIC BLOOD PRESSURE: 64 MMHG

## 2019-12-18 VITALS — SYSTOLIC BLOOD PRESSURE: 156 MMHG | DIASTOLIC BLOOD PRESSURE: 62 MMHG

## 2019-12-18 VITALS — DIASTOLIC BLOOD PRESSURE: 51 MMHG | SYSTOLIC BLOOD PRESSURE: 106 MMHG

## 2019-12-18 LAB
BASOPHILS # BLD AUTO: 0.1 K/UL (ref 0–8)
BASOPHILS NFR BLD AUTO: 1.2 % (ref 0–2)
BUN SERPL-MCNC: 15 MG/DL (ref 7–18)
CHLORIDE SERPL-SCNC: 107 MMOL/L (ref 98–107)
CO2 SERPL-SCNC: 23 MMOL/L (ref 21–32)
CREAT SERPL-MCNC: 1.2 MG/DL (ref 0.6–1.3)
EOSINOPHIL # BLD AUTO: 0.1 K/UL (ref 0–0.7)
EOSINOPHIL NFR BLD AUTO: 3.1 % (ref 0–7)
GLUCOSE SERPL-MCNC: 109 MG/DL (ref 74–106)
HCT VFR BLD AUTO: 32.6 % (ref 36.7–47.1)
HGB BLD-MCNC: 10.4 G/DL (ref 12.5–16.3)
LYMPHOCYTES # BLD AUTO: 1.5 K/UL (ref 20–40)
LYMPHOCYTES NFR BLD AUTO: 30.9 % (ref 20.5–51.5)
MCH RBC QN AUTO: 26.4 UUG (ref 23.8–33.4)
MCHC RBC AUTO-ENTMCNC: 32 G/DL (ref 32.5–36.3)
MCV RBC AUTO: 82.8 FL (ref 73–96.2)
MONOCYTES # BLD AUTO: 0.5 K/UL (ref 2–10)
MONOCYTES NFR BLD AUTO: 9.8 % (ref 0–11)
NEUTROPHILS # BLD AUTO: 2.7 K/UL (ref 1.8–8.9)
NEUTROPHILS NFR BLD AUTO: 55 % (ref 38.5–71.5)
PLATELET # BLD AUTO: 207 K/UL (ref 152–348)
POTASSIUM SERPL-SCNC: 4.5 MMOL/L (ref 3.5–5.1)
RBC # BLD AUTO: 3.94 MIL/UL (ref 4.06–5.63)
WBC # BLD AUTO: 4.8 K/UL (ref 3.6–10.2)

## 2019-12-18 RX ADMIN — METFORMIN HYDROCHLORIDE SCH MG: 500 TABLET ORAL at 08:46

## 2019-12-18 RX ADMIN — Medication SCH EACH: at 16:48

## 2019-12-18 RX ADMIN — Medication SCH EACH: at 11:41

## 2019-12-18 RX ADMIN — ASPIRIN SCH MG: 81 TABLET, CHEWABLE ORAL at 08:46

## 2019-12-18 RX ADMIN — Medication SCH MG: at 08:47

## 2019-12-18 RX ADMIN — METFORMIN HYDROCHLORIDE SCH MG: 500 TABLET ORAL at 18:09

## 2019-12-18 RX ADMIN — Medication SCH EACH: at 06:35

## 2019-12-18 RX ADMIN — LOSARTAN POTASSIUM SCH MG: 50 TABLET, FILM COATED ORAL at 08:46

## 2019-12-18 RX ADMIN — Medication SCH MG: at 11:11

## 2019-12-18 RX ADMIN — Medication SCH MG: at 17:00

## 2019-12-18 RX ADMIN — AMLODIPINE BESYLATE SCH MG: 5 TABLET ORAL at 21:00

## 2019-12-18 RX ADMIN — Medication SCH EACH: at 17:48

## 2019-12-18 RX ADMIN — Medication SCH EACH: at 21:18

## 2019-12-19 VITALS — SYSTOLIC BLOOD PRESSURE: 101 MMHG | DIASTOLIC BLOOD PRESSURE: 50 MMHG

## 2019-12-19 VITALS — SYSTOLIC BLOOD PRESSURE: 114 MMHG | DIASTOLIC BLOOD PRESSURE: 66 MMHG

## 2019-12-19 LAB
BASOPHILS # BLD AUTO: 0.1 K/UL (ref 0–8)
BASOPHILS NFR BLD AUTO: 0.8 % (ref 0–2)
BUN SERPL-MCNC: 19 MG/DL (ref 7–18)
CHLORIDE SERPL-SCNC: 106 MMOL/L (ref 98–107)
CHOLEST SERPL-MCNC: 91 MG/DL (ref ?–200)
CO2 SERPL-SCNC: 24 MMOL/L (ref 21–32)
CREAT SERPL-MCNC: 1.2 MG/DL (ref 0.6–1.3)
EOSINOPHIL # BLD AUTO: 0.1 K/UL (ref 0–0.7)
EOSINOPHIL NFR BLD AUTO: 1.9 % (ref 0–7)
GLUCOSE SERPL-MCNC: 76 MG/DL (ref 74–106)
HCT VFR BLD AUTO: 31.4 % (ref 36.7–47.1)
HDLC SERPL-MCNC: 40 MG/DL (ref 40–60)
HGB BLD-MCNC: 10.1 G/DL (ref 12.5–16.3)
LYMPHOCYTES # BLD AUTO: 1.6 K/UL (ref 20–40)
LYMPHOCYTES NFR BLD AUTO: 25.3 % (ref 20.5–51.5)
MAGNESIUM SERPL-MCNC: 1.9 MG/DL (ref 1.8–2.4)
MCH RBC QN AUTO: 26.1 UUG (ref 23.8–33.4)
MCHC RBC AUTO-ENTMCNC: 32 G/DL (ref 32.5–36.3)
MCV RBC AUTO: 81.8 FL (ref 73–96.2)
MONOCYTES # BLD AUTO: 0.5 K/UL (ref 2–10)
MONOCYTES NFR BLD AUTO: 8.2 % (ref 0–11)
NEUTROPHILS # BLD AUTO: 4.2 K/UL (ref 1.8–8.9)
NEUTROPHILS NFR BLD AUTO: 63.8 % (ref 38.5–71.5)
PHOSPHATE SERPL-MCNC: 3.5 MG/DL (ref 2.5–4.9)
PLATELET # BLD AUTO: 196 K/UL (ref 152–348)
POTASSIUM SERPL-SCNC: 4.6 MMOL/L (ref 3.5–5.1)
RBC # BLD AUTO: 3.84 MIL/UL (ref 4.06–5.63)
TRIGL SERPL-MCNC: 56 MG/DL (ref 30–150)
WBC # BLD AUTO: 6.5 K/UL (ref 3.6–10.2)

## 2019-12-19 RX ADMIN — Medication SCH EACH: at 06:08

## 2019-12-19 RX ADMIN — METFORMIN HYDROCHLORIDE SCH MG: 500 TABLET ORAL at 08:35

## 2019-12-19 RX ADMIN — LOSARTAN POTASSIUM SCH MG: 50 TABLET, FILM COATED ORAL at 08:36

## 2019-12-19 RX ADMIN — AMLODIPINE BESYLATE SCH MG: 5 TABLET ORAL at 08:37

## 2019-12-19 RX ADMIN — Medication SCH MG: at 08:37

## 2019-12-19 RX ADMIN — ASPIRIN SCH MG: 81 TABLET, CHEWABLE ORAL at 08:35

## 2021-03-04 ENCOUNTER — HOSPITAL ENCOUNTER (EMERGENCY)
Dept: HOSPITAL 12 - ER | Age: 81
Discharge: HOME | End: 2021-03-04
Payer: MEDICARE

## 2021-03-04 VITALS — BODY MASS INDEX: 32.14 KG/M2 | WEIGHT: 200 LBS | HEIGHT: 66 IN

## 2021-03-04 VITALS — DIASTOLIC BLOOD PRESSURE: 78 MMHG | SYSTOLIC BLOOD PRESSURE: 131 MMHG

## 2021-03-04 DIAGNOSIS — Z95.1: ICD-10-CM

## 2021-03-04 DIAGNOSIS — E11.9: ICD-10-CM

## 2021-03-04 DIAGNOSIS — Z79.4: ICD-10-CM

## 2021-03-04 DIAGNOSIS — E78.5: ICD-10-CM

## 2021-03-04 DIAGNOSIS — Z95.0: ICD-10-CM

## 2021-03-04 DIAGNOSIS — I10: ICD-10-CM

## 2021-03-04 DIAGNOSIS — R00.1: ICD-10-CM

## 2021-03-04 DIAGNOSIS — Z79.899: ICD-10-CM

## 2021-03-04 DIAGNOSIS — I25.10: ICD-10-CM

## 2021-03-04 DIAGNOSIS — Z85.46: ICD-10-CM

## 2021-03-04 DIAGNOSIS — R06.00: Primary | ICD-10-CM

## 2021-03-04 LAB
BASOPHILS # BLD AUTO: 0.1 K/UL (ref 0–8)
BASOPHILS NFR BLD AUTO: 1.2 % (ref 0–2)
BUN SERPL-MCNC: 15 MG/DL (ref 7–18)
CHLORIDE SERPL-SCNC: 102 MMOL/L (ref 98–107)
CO2 SERPL-SCNC: 24 MMOL/L (ref 21–32)
CREAT SERPL-MCNC: 1.4 MG/DL (ref 0.6–1.3)
EOSINOPHIL # BLD AUTO: 0.1 K/UL (ref 0–0.7)
EOSINOPHIL NFR BLD AUTO: 1.9 % (ref 0–7)
GLUCOSE SERPL-MCNC: 250 MG/DL (ref 74–106)
HCT VFR BLD AUTO: 40.1 % (ref 36.7–47.1)
HGB BLD-MCNC: 13.6 G/DL (ref 12.5–16.3)
LYMPHOCYTES # BLD AUTO: 1.4 K/UL (ref 20–40)
LYMPHOCYTES NFR BLD AUTO: 24.5 % (ref 20.5–51.5)
MCH RBC QN AUTO: 31.3 UUG (ref 23.8–33.4)
MCHC RBC AUTO-ENTMCNC: 34 G/DL (ref 32.5–36.3)
MCV RBC AUTO: 92.7 FL (ref 73–96.2)
MONOCYTES # BLD AUTO: 0.4 K/UL (ref 2–10)
MONOCYTES NFR BLD AUTO: 7.4 % (ref 0–11)
NEUTROPHILS # BLD AUTO: 3.8 K/UL (ref 1.8–8.9)
NEUTROPHILS NFR BLD AUTO: 65 % (ref 38.5–71.5)
PLATELET # BLD AUTO: 234 K/UL (ref 152–348)
POTASSIUM SERPL-SCNC: 4.5 MMOL/L (ref 3.5–5.1)
RBC # BLD AUTO: 4.33 MIL/UL (ref 4.06–5.63)
WBC # BLD AUTO: 5.8 K/UL (ref 3.6–10.2)

## 2021-03-04 PROCEDURE — A4663 DIALYSIS BLOOD PRESSURE CUFF: HCPCS

## 2021-03-04 NOTE — NUR
Patient discharged to home in stable condition.  Written and verbal after care 
instructions given. 

Patient verbalizes understanding of instructions. Stressed follow up or return 
to ER for worsening s/s. PT DENEIS ANY SOB AT THIS TIME. PT REAMINED CALM THE 
WHOLE ER STAY, NO SIGN OF BREATHING DISTRESS.

## 2021-07-25 NOTE — NUR
DC AND FOLLOW UP INSTRUCTIONS GIVEN AND EXPLAINED TO PATIENT AND SON WHO STATE 
THEY UNDERSTAND ALL INSTRUCTIONS. Yes, during the last year

## 2024-09-11 NOTE — NUR
Ongoing discussion about smoking cessation; declines lung cancer screening; given new rx for nicotine patches   Pt returned safely, VSS. New order received regarding to start on 2gm Na diabetic diet. Will 
continue to monitor.